# Patient Record
Sex: MALE | Race: WHITE | URBAN - METROPOLITAN AREA
[De-identification: names, ages, dates, MRNs, and addresses within clinical notes are randomized per-mention and may not be internally consistent; named-entity substitution may affect disease eponyms.]

---

## 2023-01-12 ENCOUNTER — OFFICE (OUTPATIENT)
Dept: URBAN - METROPOLITAN AREA CLINIC 57 | Facility: CLINIC | Age: 38
End: 2023-01-12

## 2023-01-12 VITALS
SYSTOLIC BLOOD PRESSURE: 120 MMHG | HEIGHT: 70 IN | DIASTOLIC BLOOD PRESSURE: 76 MMHG | RESPIRATION RATE: 16 BRPM | HEART RATE: 67 BPM | WEIGHT: 180 LBS | TEMPERATURE: 98 F

## 2023-01-12 DIAGNOSIS — R10.12 LUQ PAIN: ICD-10-CM

## 2023-01-12 PROCEDURE — 99204 OFFICE O/P NEW MOD 45 MIN: CPT | Performed by: INTERNAL MEDICINE

## 2023-01-12 NOTE — SERVICEHPINOTES
The patient is a 37-year-old gentleman who was given my name by Dr. Nava. For the past 2 years he has noticed intermittent left upper quadrant pain. It has a time awoken him from sleep. He tried a proton pump inhibitor but this did not help the symptoms. He oftentimes awakens with a sensation of fullness in the morning. Despite his symptoms his weight has been stable. He admits to being under stress. The pain typically occurs between 5 and 8 days of the month. His last hours and is described as a dull ache. Pepto-Bismol and proton pump inhibitors have not helped the pain. There is no relationship to food or bowel movements. Nonetheless when he is active he feels better. There is no radiation of the pain. This for abstinence severity. It has awoken him from sleep at times. He has not had similar episodes with stress at other points in his life. He denies any aspirin uses Advil on rare occasion. He has one episode of severe pain that follow taking Advil without water. He denies any heartburn, dysphagia, odynophagia or nausea or vomiting. He has daily bowel movements without diarrhea, constipation or rectal bleeding. Recent labs were normal except an iron of 26.

## 2023-05-04 ENCOUNTER — HOSPITAL ENCOUNTER (EMERGENCY)
Dept: HOSPITAL 12 - ER | Age: 38
Discharge: HOME | End: 2023-05-04
Payer: COMMERCIAL

## 2023-05-04 VITALS — WEIGHT: 185 LBS | HEIGHT: 70 IN | BODY MASS INDEX: 26.48 KG/M2

## 2023-05-04 VITALS — DIASTOLIC BLOOD PRESSURE: 79 MMHG | SYSTOLIC BLOOD PRESSURE: 125 MMHG

## 2023-05-04 DIAGNOSIS — R13.10: Primary | ICD-10-CM

## 2023-05-04 PROCEDURE — A4663 DIALYSIS BLOOD PRESSURE CUFF: HCPCS

## 2024-01-25 VITALS
WEIGHT: 190 LBS | RESPIRATION RATE: 16 BRPM | HEIGHT: 70 IN | HEART RATE: 70 BPM | TEMPERATURE: 98 F | SYSTOLIC BLOOD PRESSURE: 134 MMHG | DIASTOLIC BLOOD PRESSURE: 85 MMHG

## 2024-01-26 ENCOUNTER — OFFICE (OUTPATIENT)
Dept: URBAN - METROPOLITAN AREA CLINIC 57 | Facility: CLINIC | Age: 39
End: 2024-01-26

## 2024-01-26 DIAGNOSIS — R10.12 LUQ PAIN: ICD-10-CM

## 2024-01-26 PROCEDURE — 99214 OFFICE O/P EST MOD 30 MIN: CPT | Performed by: INTERNAL MEDICINE

## 2024-01-26 RX ORDER — HYOSCYAMINE SULFATE 0.12 MG/1
TABLET ORAL; SUBLINGUAL
Qty: 30 | Status: ACTIVE
Start: 2024-01-26

## 2024-01-26 NOTE — SERVICEHPINOTES
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 They usually occur in the early morning. The can occurs as early as 4 AM. They usually resolve fairly quickly within minutes. It is described as a pressure without radiation. Sometimes he feels bloated. There are no palliative or provocative factors. He has tried Prilosec and Pepto-Bismol without relief of symptoms. He denies aspirin or nonsteroidal usage. He denies any heartburn, dysphagia or odynophagia. He had some throat symptoms and saw Dr. Rod. CT scan was ordered. A CT scan of the abdomen was ordered that same time which showed a tiny fat-containing umbilical hernia and small fat-containing bilateral inguinal areas an incidental small calcified pleural plaque was noted. In addition there was fullness in the ventral uncinate process. An MRCP was subsequently done which was unremarkable. The patient also had an episode of substantial diarrhea in November which led to a stool panel being performed which was negative. At that time the calprotectin was less than 16.1. Although in the past and iron level was noted to be low at 26, more recent laboratory data from August 2023 showed an iron level of 83. CBC and chemistries are otherwise unremarkable except for a vitamin D level of 26 and cholesterol 266 .

## 2024-02-26 VITALS — HEIGHT: 70 IN

## 2024-02-27 ENCOUNTER — AMBULATORY SURGICAL CENTER (OUTPATIENT)
Dept: URBAN - METROPOLITAN AREA SURGERY 36 | Facility: SURGERY | Age: 39
End: 2024-02-27